# Patient Record
(demographics unavailable — no encounter records)

---

## 2024-10-31 NOTE — RETURN TO WORK/SCHOOL
[Return Date: _____] : as of [unfilled].  This has been discussed in detail with ~Nemo~ and ~he/she~ understands this. [Other: ___] : [unfilled] [FreeTextEntry2] : Dr. Manjinder Qiu

## 2024-10-31 NOTE — DISCUSSION/SUMMARY
[de-identified] : The underlying pathophysiology was reviewed with the patient. XR films were reviewed with the patient. Discussed at length the nature of the patients condition. Their left knee symptoms appear secondary to inflammation from arthritis. The patient and I discussed his options regarding treatment.

## 2024-10-31 NOTE — HISTORY OF PRESENT ILLNESS
[FreeTextEntry1] : Pt is a 45 y/o male c/o left knee and left great toe pain.  He tripped over a sign when leaving work on 10/17/24.  He had pain immediately.  The pain persisted.  He went to Urgent Care 2 days later where xrays were taken of the knee and toe.  He states that the knee pain has improved.  He still has some pain in the toe when he puts pressure on it.

## 2024-10-31 NOTE — PHYSICAL EXAM
[de-identified] : Patient is WDWN, alert, and in no acute distress. Breathing is unlabored. He is grossly oriented to person, place, and time.   Left Knee: There is medial & lateral joint line tenderness to palpation. No swelling, no valgus or valgus instability present on provocative testing.  Range of motion: Active flexion and extension are full and painless. No crepitus.  Tests and Signs: All tests for stability are normal.  Strength: flexion and extension 5/5  Left Foot: Inspection/Palpation: There is tenderness to palpation over the 1st MTP joint. Mild edema, no ecchymosis.  Range of Motion: Full and painless in all planes  Stability: Joint stability is intact. Full range of motion in toes.  Strength: Plantar flexion, dorsiflexion, inversion and eversion 5/5

## 2024-10-31 NOTE — ADDENDUM
[FreeTextEntry1] : I, Filiberto Anderson Jr, acted solely as a scribe for Dr. Manjinder Qiu on this date 10/31/2024  All medical record entries made by the Scribe were at my, Dr. Manjinder Qiu, direction and personally dictated by me on 10/31/2024 . I have reviewed the chart and agree that the record accurately reflects my personal performance of the history, physical exam, assessment and plan. I have also personally directed, reviewed, and agreed with the chart.

## 2024-12-20 NOTE — PHYSICAL EXAM
[No Acute Distress] : no acute distress [Speech Grossly Normal] : speech grossly normal [Alert and Oriented x3] : oriented to person, place, and time [de-identified] : unable to examine pt due to telephonic encounter,

## 2024-12-20 NOTE — HISTORY OF PRESENT ILLNESS
[Other Location: e.g. School (Enter Location, City,State)___] : at [unfilled], at the time of the visit. [Medical Office: ([facility])___] : at the medical office located in  [Verbal consent obtained from patient] : the patient, [unfilled] [FreeTextEntry1] : Pt [resented for f/u of HLD and DM.  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 [de-identified] : He feels well w/o any new complaint.  Pt was started on Rosuvastatin 3 months ago and he tolerated meds well.,  Pt has been taking Mounjaro over the past 6 months.  He was able to lose weight and lost about 70 lbs, weight was 240 lb last week from 312 lbs in July. He's on the higher dose of Mounjaro 5 mg per week for a month and cardiologist just increased the dose to 7.5 mg at today's apt. He is tolerating the meds, there was some SE at 1st, but they are not bad now.

## 2024-12-20 NOTE — PHYSICAL EXAM
[No Acute Distress] : no acute distress [Speech Grossly Normal] : speech grossly normal [Alert and Oriented x3] : oriented to person, place, and time [de-identified] : unable to examine pt due to telephonic encounter,

## 2024-12-20 NOTE — HISTORY OF PRESENT ILLNESS
[Other Location: e.g. School (Enter Location, City,State)___] : at [unfilled], at the time of the visit. [Medical Office: ([facility])___] : at the medical office located in  [Verbal consent obtained from patient] : the patient, [unfilled] [FreeTextEntry1] : Pt [resented for f/u of HLD and DM.  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 [de-identified] : He feels well w/o any new complaint.  Pt was started on Rosuvastatin 3 months ago and he tolerated meds well.,  Pt has been taking Mounjaro over the past 6 months.  He was able to lose weight and lost about 70 lbs, weight was 240 lb last week from 312 lbs in July. He's on the higher dose of Mounjaro 5 mg per week for a month and cardiologist just increased the dose to 7.5 mg at today's apt. He is tolerating the meds, there was some SE at 1st, but they are not bad now.

## 2025-03-19 NOTE — DISCUSSION/SUMMARY
[FreeTextEntry1] : obesity/T2DM/HLD  - Patient to continue Mounjaro 10 mg once weekly for four weeks. All side effects reviewed. Questions and concerns addressed.  -Will START walking for exercise  - A detailed discussion took place about the importance of CV risk reduction - The patient understands the importance of incorporating moderate aerobic exercise, 4 times/week for 40 minutes to reduce risk of CV disease.   - A detailed discussion of lifestyle modification was done today. Patient understands the importance of a heart healthy diet and incorporating veggies/legumes/fruits/whole grains and limiting processed foods, sugars and carbs in their diet. - patient understands that stress reduction along with good sleep hygiene will help aid in weight loss - Follow up in 4 weeks  - The patient has a good understanding of the diagnosis, treatment plan and lifestyle modification. she will contact me at the office for any questions with their care or any changes in their health status.  Case discussed with Dr Kris Coughlin.

## 2025-03-19 NOTE — HISTORY OF PRESENT ILLNESS
[FreeTextEntry1] : Mr. ILANA MARINELLI  is a 44-year-old male who has a past medical history significant for HLD, morbid obesity and T2DM who presents via telehealth for a follow up evaluation. Patient feels generally well today. Denies SOB, chest pain, palpitations, dizziness, and syncope.     Blood work on 6/2024 demonstrated: Triglycerides: 67 Total Cholesterol: 113 HDL: 37 LDL: 62 Non-HDL: 76 A1C: 6 previously 7.1  ================ ================ 9/2024 HLD, Obesity/T2DM/BMI 48.87 Mounjaro 2.5 mg x 6 doses -  Reports both diarrhea and constipation - Working on diet, recommend he sees a RD - Appetite suppression - Clothes fit loser, he's down a belt size. Has not weighed himself LDL increased to 143, start Rosuvastatin 40 mg ========================== 10/2024 HLD/Obesity/T2DM/BMI 48.87 Mounjaro 2 5 mg x 2 months -  Reports some constipation, having a BM every other day instead of every day - Diarrhea has now resolved - Works night shift, eats smaller portions during the day, gets hungrier at night - Will be seeing RD -  Has not weighed himself, but reports belt in work pants is looser - On Rosuvastatin 40 mg ================ 11/2024 Mounjaro 5 mg X 4 doses - feeling well, no side effects -  adjusting belt, recent weight of 245 lbs. -  following with registered dietician =================== 12/2024 Mounjaro 5 mg x 4 doses  -  no side effects  - Recent weight of 240 lbs. -Feeling hungrier - Lost 40 lbs. since starting medication ======================= 1/2025 Mounjaro 7.5 mg x 3 doses - Recent changed his schedule.Working 12pm-8 pm  - Eatings habits have changed, eating more. - Having no side effects, feels well. - Will increase dose. - On Rosuvastatin with great reduction in LDL cholesterol. =============== 2/2025 Mounjaro 10 mg - Reported nausea after first injection, now resolved - Reports decreased snacking with current dose. Decreased portions - He is working on limiting carbs - Weight of 222 lbs =============== 3/2025 Mounjaro 10 mg -Reports nausea after 1st dose which resolves -Eating 2-3 meals daily prioritizing protein and low sugar -Does not exercise -Weight 219 lbs (from 222 lbs)

## 2025-04-15 NOTE — DISCUSSION/SUMMARY
[FreeTextEntry1] : obesity/T2DM/HLD  - Patient to continue increase Mounjaro  12.5  once weekly for four weeks. All side effects reviewed. Questions and concerns addressed.  -Will START walking for exercise  - A detailed discussion took place about the importance of CV risk reduction - The patient understands the importance of incorporating moderate aerobic exercise, 4 times/week for 40 minutes to reduce risk of CV disease.   - A detailed discussion of lifestyle modification was done today. Patient understands the importance of a heart healthy diet and incorporating veggies/legumes/fruits/whole grains and limiting processed foods, sugars and carbs in their diet. - patient understands that stress reduction along with good sleep hygiene will help aid in weight loss - Follow up in 4 weeks  - The patient has a good understanding of the diagnosis, treatment plan and lifestyle modification. she will contact me at the office for any questions with their care or any changes in their health status.  Case discussed with Dr Kris Coughlin.

## 2025-04-15 NOTE — HISTORY OF PRESENT ILLNESS
[FreeTextEntry1] : Mr. ILANA MARINELLI  is a 44-year-old male who has a past medical history significant for HLD, morbid obesity and T2DM who presents via telehealth for a follow up evaluation. Patient feels generally well today. Denies SOB, chest pain, palpitations, dizziness, and syncope.     Blood work on 6/2024 demonstrated: Triglycerides: 67 Total Cholesterol: 113 HDL: 37 LDL: 62 Non-HDL: 76 A1C: 6 previously 7.1  ================ ================ 9/2024 HLD, Obesity/T2DM/BMI 48.87 Mounjaro 2.5 mg x 6 doses -  Reports both diarrhea and constipation - Working on diet, recommend he sees a RD - Appetite suppression - Clothes fit loser, he's down a belt size. Has not weighed himself LDL increased to 143, start Rosuvastatin 40 mg ========================== 10/2024 HLD/Obesity/T2DM/BMI 48.87 Mounjaro 2 5 mg x 2 months -  Reports some constipation, having a BM every other day instead of every day - Diarrhea has now resolved - Works night shift, eats smaller portions during the day, gets hungrier at night - Will be seeing RD -  Has not weighed himself, but reports belt in work pants is looser - On Rosuvastatin 40 mg ================ 11/2024 Mounjaro 5 mg X 4 doses - feeling well, no side effects -  adjusting belt, recent weight of 245 lbs. -  following with registered dietician =================== 12/2024 Mounjaro 5 mg x 4 doses  -  no side effects  - Recent weight of 240 lbs. -Feeling hungrier - Lost 40 lbs. since starting medication ======================= 1/2025 Mounjaro 7.5 mg x 3 doses - Recent changed his schedule.Working 12pm-8 pm  - Eatings habits have changed, eating more. - Having no side effects, feels well. - Will increase dose. - On Rosuvastatin with great reduction in LDL cholesterol. =============== 2/2025 Mounjaro 10 mg - Reported nausea after first injection, now resolved - Reports decreased snacking with current dose. 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 Tele ceci was done today using a two-way audiovisual. Patient was at home. Provider was in office ( or home office ) Consent was provided by patient. Only the patient and provider in the visit.

## 2025-05-29 NOTE — HISTORY OF PRESENT ILLNESS
[FreeTextEntry1] : Mr. ILANA MARINELLI  is a 45-year-old male who has a past medical history significant for HLD, morbid obesity and T2DM who presents via telehealth for a follow up evaluation. Patient feels generally well today. Denies SOB, chest pain, palpitations, dizziness, and syncope.  Telehealth visit today on [facility]. Telehealth was done using 2 way audiovisual. Patient was at home. Provider was in home office. Consent was provided by patient. Only patient and provider in the visit.      Blood work on 6/2024 demonstrated: Triglycerides: 67 Total Cholesterol: 113 HDL: 37 LDL: 62 Non-HDL: 76 A1C: 6 previously 7.1  ================ ================ 9/2024 HLD, Obesity/T2DM/BMI 48.87 Mounjaro 2.5 mg x 6 doses -  Reports both diarrhea and constipation - Working on diet, recommend he sees a RD - Appetite suppression - Clothes fit loser, he's down a belt size. Has not weighed himself LDL increased to 143, start Rosuvastatin 40 mg ========================== 10/2024 HLD/Obesity/T2DM/BMI 48.87 Mounjaro 2 5 mg x 2 months -  Reports some constipation, having a BM every other day instead of every day - Diarrhea has now resolved - Works night shift, eats smaller portions during the day, gets hungrier at night - Will be seeing RD -  Has not weighed himself, but reports belt in work pants is looser - On Rosuvastatin 40 mg ================ 11/2024 Mounjaro 5 mg X 4 doses - feeling well, no side effects -  adjusting belt, recent weight of 245 lbs. -  following with registered dietician =================== 12/2024 Mounjaro 5 mg x 4 doses  -  no side effects  - Recent weight of 240 lbs. -Feeling hungrier - Lost 40 lbs. since starting medication ======================= 1/2025 Mounjaro 7.5 mg x 3 doses - Recent changed his schedule.Working 12pm-8 pm  - Eatings habits have changed, eating more. - Having no side effects, feels well. - Will increase dose. - On Rosuvastatin with great reduction in LDL cholesterol. =============== 2/2025 Mounjaro 10 mg - Reported nausea after first injection, now resolved - Reports decreased snacking with current dose. 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 Tele ceci was done today using a two-way audiovisual. Patient was at home. Provider was in office ( or home office ) Consent was provided by patient. Only the patient and provider in the visit. ================================  5/2025 Mounjaro 12.5 x 6 doses -Experienced some nausea and headache first 3 doses which resolved -Feels more satiated than prior doses however admits diet could improve.  Not eating enough protein.  Eating mostly carbs.  Not drinking enough water. -Did not start walking routine but states he gets he is on his feet for work. -Weight: 219 lbs (unchanged)

## 2025-05-29 NOTE — DISCUSSION/SUMMARY
[FreeTextEntry1] : obesity/T2DM/HLD  - Patient to continue Mounjaro  12.5  once weekly for four weeks. All side effects reviewed. Questions and concerns addressed.  -Discussed sticking to portion sizes for carbs and increasing protein to feel satiated longer -Will START walking for exercise -Can consider increasing dose if no weight loss over the next 4 weeks -Bloodwork to be completed prior to next visit.  - A detailed discussion took place about the importance of CV risk reduction - The patient understands the importance of incorporating moderate aerobic exercise, 4 times/week for 40 minutes to reduce risk of CV disease.   - A detailed discussion of lifestyle modification was done today. Patient understands the importance of a heart healthy diet and incorporating veggies/legumes/fruits/whole grains and limiting processed foods, sugars and carbs in their diet. - patient understands that stress reduction along with good sleep hygiene will help aid in weight loss - Follow up in 4 weeks  - The patient has a good understanding of the diagnosis, treatment plan and lifestyle modification. she will contact me at the office for any questions with their care or any changes in their health status.  Case discussed with Dr Kris Coughlin.                                       I have spent 30 minutes on the patient encounter including explaining and formulating rationale for treatment plan. >50% of time spent in direct counseling reviewing all tests, labs, and imaging and conferring with patient, family member, and other physicians regarding patient care.  Time Spent excludes teaching and other separately reported billable services.                                            =         =                             --                                                                                                     -

## 2025-06-24 NOTE — REASON FOR VISIT
[FreeTextEntry1] : Telehealth visit today on [facility]. Telehealth was done using 2 way audiovisual. Patient was at home. Both patient and provider in [facility].  Provider was in office. Patient advised that TEB appointment is treated the same as how an in office appt would be treated with regard to operations and [facility] invoicing.

## 2025-06-24 NOTE — HISTORY OF PRESENT ILLNESS
[FreeTextEntry1] : Mr. ILANA MARINELLI  is a 45-year-old male who has a past medical history significant for HLD, morbid obesity and T2DM who presents via telehealth for a follow up evaluation. Patient feels generally well today. Denies SOB, chest pain, palpitations, dizziness, and syncope.    Blood work on 6/2024 demonstrated: Triglycerides: 67 Total Cholesterol: 113 HDL: 37 LDL: 62 Non-HDL: 76 A1C: 6 previously 7.1  ================ ================ 9/2024 HLD, Obesity/T2DM/BMI 48.87 Mounjaro 2.5 mg x 6 doses -  Reports both diarrhea and constipation - Working on diet, recommend he sees a RD - Appetite suppression - Clothes fit loser, he's down a belt size. Has not weighed himself LDL increased to 143, start Rosuvastatin 40 mg ========================== 10/2024 HLD/Obesity/T2DM/BMI 48.87 Mounjaro 2 5 mg x 2 months -  Reports some constipation, having a BM every other day instead of every day - Diarrhea has now resolved - Works night shift, eats smaller portions during the day, gets hungrier at night - Will be seeing RD -  Has not weighed himself, but reports belt in work pants is looser - On Rosuvastatin 40 mg ================ 11/2024 Mounjaro 5 mg X 4 doses - feeling well, no side effects -  adjusting belt, recent weight of 245 lbs. -  following with registered dietician =================== 12/2024 Mounjaro 5 mg x 4 doses  -  no side effects  - Recent weight of 240 lbs. -Feeling hungrier - Lost 40 lbs. since starting medication ======================= 1/2025 Mounjaro 7.5 mg x 3 doses - Recent changed his schedule.Working 12pm-8 pm  - Eatings habits have changed, eating more. - Having no side effects, feels well. - Will increase dose. - On Rosuvastatin with great reduction in LDL cholesterol. =============== 2/2025 Mounjaro 10 mg - Reported nausea after first injection, now resolved - Reports decreased snacking with current dose. 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 Tele ceci was done today using a two-way audiovisual. Patient was at home. Provider was in office ( or home office ) Consent was provided by patient. Only the patient and provider in the visit. ================================  5/2025 Mounjaro 12.5 x 6 doses -Experienced some nausea and headache first 3 doses which resolved -Feels more satiated than prior doses however admits diet could improve.  Not eating enough protein.  Eating mostly carbs.  Not drinking enough water. -Did not start walking routine but states he gets he is on his feet for work. -Weight: 219 lbs (unchanged) ================================ 6/2025 Mounjaro 12.5mg x 4 doses -Denies side effects -Altered diet by adding more protein (fish).  Due to busy nature of job tends to only eat 2 meals while at work.  Feels satiated. -Exercise: Encorporated more walking at work -Weight: 208 lb (decreased)

## 2025-06-24 NOTE — DISCUSSION/SUMMARY
[FreeTextEntry1] : obesity/T2DM/HLD/HTN  - Successful weight loss this past month.  Patient to continue Mounjaro  12.5  once weekly for six weeks. All side effects reviewed. Questions and concerns addressed.  -Discussed continuing to monitor portion sizes for carbs and prioritizing protein to feel satiated longer -Reminded to have bloodwork completed prior to next visit.  - A detailed discussion took place about the importance of CV risk reduction - The patient understands the importance of incorporating moderate aerobic exercise, 4 times/week for 40 minutes to reduce risk of CV disease.   - A detailed discussion of lifestyle modification was done today. Patient understands the importance of a heart healthy diet and incorporating veggies/legumes/fruits/whole grains and limiting processed foods, sugars and carbs in their diet. - patient understands that stress reduction along with good sleep hygiene will help aid in weight loss - Follow up in 6 weeks  - The patient has a good understanding of the diagnosis, treatment plan and lifestyle modification. she will contact me at the office for any questions with their care or any changes in their health status.  Case discussed with Dr Kris Coughlin.